# Patient Record
Sex: MALE | Race: WHITE | NOT HISPANIC OR LATINO | URBAN - METROPOLITAN AREA
[De-identification: names, ages, dates, MRNs, and addresses within clinical notes are randomized per-mention and may not be internally consistent; named-entity substitution may affect disease eponyms.]

---

## 2017-05-16 ENCOUNTER — SKIN CHECK (OUTPATIENT)
Dept: URBAN - METROPOLITAN AREA CLINIC 9 | Facility: CLINIC | Age: 47
Setting detail: DERMATOLOGY
End: 2017-05-16

## 2017-05-16 DIAGNOSIS — L82.1 OTHER SEBORRHEIC KERATOSIS: ICD-10-CM

## 2017-05-16 DIAGNOSIS — Z85.828 PERSONAL HISTORY OF OTHER MALIGNANT NEOPLASM OF SKIN: ICD-10-CM

## 2017-05-16 DIAGNOSIS — D18.01 HEMANGIOMA OF SKIN AND SUBCUTANEOUS TISSUE: ICD-10-CM

## 2017-05-16 PROCEDURE — 99214 OFFICE O/P EST MOD 30 MIN: CPT

## 2017-11-14 ENCOUNTER — SKIN CANCER-MELANOMA FOLLOW-UP (OUTPATIENT)
Dept: URBAN - METROPOLITAN AREA CLINIC 9 | Facility: CLINIC | Age: 47
Setting detail: DERMATOLOGY
End: 2017-11-14

## 2017-11-14 DIAGNOSIS — L57.0 ACTINIC KERATOSIS: ICD-10-CM

## 2017-11-14 PROCEDURE — 99214 OFFICE O/P EST MOD 30 MIN: CPT

## 2017-11-14 RX ORDER — MOMETASONE FUROATE 1 MG/G
1 APPLICATION CREAM TOPICAL BID
Qty: 45 | Refills: 2 | Status: DISCONTINUED
Start: 2017-11-14 | End: 2019-01-17

## 2018-05-23 ENCOUNTER — SKIN CANCER-MELANOMA FOLLOW-UP (OUTPATIENT)
Dept: URBAN - METROPOLITAN AREA CLINIC 9 | Facility: CLINIC | Age: 48
Setting detail: DERMATOLOGY
End: 2018-05-23

## 2018-05-23 DIAGNOSIS — L98.499 NON-PRESSURE CHRONIC ULCER OF SKIN OF OTHER SITES WITH UNSPECIFIED SEVERITY: ICD-10-CM

## 2018-05-23 PROCEDURE — 99213 OFFICE O/P EST LOW 20 MIN: CPT

## 2019-01-17 ENCOUNTER — OTHER- (OUTPATIENT)
Dept: URBAN - METROPOLITAN AREA CLINIC 9 | Facility: CLINIC | Age: 49
Setting detail: DERMATOLOGY
End: 2019-01-17

## 2019-01-17 ENCOUNTER — RX ONLY (RX ONLY)
Age: 49
End: 2019-01-17

## 2019-01-17 DIAGNOSIS — C44.329 SQUAMOUS CELL CARCINOMA OF SKIN OF OTHER PARTS OF FACE: ICD-10-CM

## 2019-01-17 PROCEDURE — 99213 OFFICE O/P EST LOW 20 MIN: CPT

## 2019-01-17 RX ORDER — MOMETASONE FUROATE 1 MG/G
1 APPLICATION CREAM TOPICAL BID
Qty: 45 | Refills: 2
Start: 2019-01-17

## 2019-07-18 ENCOUNTER — SKIN CANCER-MELANOMA FOLLOW-UP (OUTPATIENT)
Dept: URBAN - METROPOLITAN AREA CLINIC 9 | Facility: CLINIC | Age: 49
Setting detail: DERMATOLOGY
End: 2019-07-18

## 2019-07-18 DIAGNOSIS — D48.5 NEOPLASM OF UNCERTAIN BEHAVIOR OF SKIN: ICD-10-CM

## 2019-07-18 PROCEDURE — 99214 OFFICE O/P EST MOD 30 MIN: CPT

## 2020-01-20 ENCOUNTER — COMPLETE SKIN EXAM (OUTPATIENT)
Dept: URBAN - METROPOLITAN AREA CLINIC 9 | Facility: CLINIC | Age: 50
Setting detail: DERMATOLOGY
End: 2020-01-20

## 2020-01-20 DIAGNOSIS — Z85.828 PERSONAL HISTORY OF OTHER MALIGNANT NEOPLASM OF SKIN: ICD-10-CM

## 2020-01-20 DIAGNOSIS — D22.9 MELANOCYTIC NEVI, UNSPECIFIED: ICD-10-CM

## 2020-01-20 DIAGNOSIS — L72.0 EPIDERMAL CYST: ICD-10-CM

## 2020-01-20 DIAGNOSIS — L91.8 OTHER HYPERTROPHIC DISORDERS OF THE SKIN: ICD-10-CM

## 2020-01-20 PROCEDURE — 99214 OFFICE O/P EST MOD 30 MIN: CPT

## 2020-07-20 ENCOUNTER — RX ONLY (RX ONLY)
Age: 50
End: 2020-07-20

## 2020-07-20 ENCOUNTER — SKIN CANCER-MELANOMA FOLLOW-UP (OUTPATIENT)
Dept: URBAN - METROPOLITAN AREA CLINIC 9 | Facility: CLINIC | Age: 50
Setting detail: DERMATOLOGY
End: 2020-07-20

## 2020-07-20 DIAGNOSIS — L57.0 ACTINIC KERATOSIS: ICD-10-CM

## 2020-07-20 PROCEDURE — 99214 OFFICE O/P EST MOD 30 MIN: CPT

## 2020-07-20 RX ORDER — HYDROCORTISONE BUTYRATE 1 MG/G
1 APPLICATION CREAM TOPICAL BID
Qty: 15 | Refills: 2
Start: 2020-07-20

## 2022-12-07 ENCOUNTER — APPOINTMENT (OUTPATIENT)
Dept: URBAN - METROPOLITAN AREA CLINIC 214 | Age: 52
Setting detail: DERMATOLOGY
End: 2022-12-07

## 2022-12-07 DIAGNOSIS — Z85.820 PERSONAL HISTORY OF MALIGNANT MELANOMA OF SKIN: ICD-10-CM

## 2022-12-07 DIAGNOSIS — Z85.828 PERSONAL HISTORY OF OTHER MALIGNANT NEOPLASM OF SKIN: ICD-10-CM

## 2022-12-07 DIAGNOSIS — L82.1 OTHER SEBORRHEIC KERATOSIS: ICD-10-CM

## 2022-12-07 DIAGNOSIS — L57.0 ACTINIC KERATOSIS: ICD-10-CM

## 2022-12-07 DIAGNOSIS — D22 MELANOCYTIC NEVI: ICD-10-CM

## 2022-12-07 DIAGNOSIS — L81.4 OTHER MELANIN HYPERPIGMENTATION: ICD-10-CM

## 2022-12-07 PROBLEM — D22.5 MELANOCYTIC NEVI OF TRUNK: Status: ACTIVE | Noted: 2022-12-07

## 2022-12-07 PROCEDURE — OTHER MIPS QUALITY: OTHER

## 2022-12-07 PROCEDURE — 99213 OFFICE O/P EST LOW 20 MIN: CPT | Mod: 25

## 2022-12-07 PROCEDURE — OTHER COUNSELING: OTHER

## 2022-12-07 PROCEDURE — OTHER SUNSCREEN RECOMMENDATIONS: OTHER

## 2022-12-07 PROCEDURE — OTHER REASSURANCE: OTHER

## 2022-12-07 PROCEDURE — OTHER LIQUID NITROGEN: OTHER

## 2022-12-07 PROCEDURE — 17000 DESTRUCT PREMALG LESION: CPT

## 2022-12-07 ASSESSMENT — LOCATION SIMPLE DESCRIPTION DERM
LOCATION SIMPLE: RIGHT LOWER BACK
LOCATION SIMPLE: LEFT UPPER BACK
LOCATION SIMPLE: LEFT LOWER BACK
LOCATION SIMPLE: RIGHT UPPER BACK
LOCATION SIMPLE: ABDOMEN
LOCATION SIMPLE: RIGHT FOREARM
LOCATION SIMPLE: CHEST

## 2022-12-07 ASSESSMENT — LOCATION DETAILED DESCRIPTION DERM
LOCATION DETAILED: RIGHT MID-UPPER BACK
LOCATION DETAILED: PERIUMBILICAL SKIN
LOCATION DETAILED: RIGHT SUPERIOR UPPER BACK
LOCATION DETAILED: RIGHT VENTRAL PROXIMAL FOREARM
LOCATION DETAILED: LEFT SUPERIOR MEDIAL LOWER BACK
LOCATION DETAILED: RIGHT MEDIAL SUPERIOR CHEST
LOCATION DETAILED: RIGHT SUPERIOR MEDIAL MIDBACK
LOCATION DETAILED: LEFT SUPERIOR UPPER BACK
LOCATION DETAILED: STERNUM

## 2022-12-07 ASSESSMENT — LOCATION ZONE DERM
LOCATION ZONE: ARM
LOCATION ZONE: TRUNK

## 2022-12-07 NOTE — PROCEDURE: MIPS QUALITY
Quality 226: Preventive Care And Screening: Tobacco Use: Screening And Cessation Intervention: Patient screened for tobacco use and is an ex/non-smoker
Quality 110: Preventive Care And Screening: Influenza Immunization: Influenza Immunization Administered during Influenza season
Quality 137: Melanoma: Continuity Of Care - Recall System: Patient information entered into a recall system that includes: target date for the next exam specified AND a process to follow up with patients regarding missed or unscheduled appointments
Detail Level: Detailed
Quality 130: Documentation Of Current Medications In The Medical Record: Current Medications Documented
Quality 431: Preventive Care And Screening: Unhealthy Alcohol Use - Screening: Patient not identified as an unhealthy alcohol user when screened for unhealthy alcohol use using a systematic screening method
Quality 138: Melanoma: Coordination Of Care: A treatment plan was communicated to the physicians providing continuing care within one month of diagnosis outlining: diagnosis, tumor thickness and a plan for surgery or alternate care.
Quality 111:Pneumonia Vaccination Status For Older Adults: Pneumococcal vaccine (PPSV23) was not administered on or after patient’s 60th birthday and before the end of the measurement period, reason not otherwise specified

## 2022-12-07 NOTE — PROCEDURE: LIQUID NITROGEN
Duration Of Freeze Thaw-Cycle (Seconds): 0
Show Aperture Variable?: Yes
Number Of Freeze-Thaw Cycles: 1 freeze-thaw cycle
Render Note In Bullet Format When Appropriate: No
Detail Level: Detailed
Post-Care Instructions: I reviewed with the patient in detail post-care instructions. Patient is to wear sunprotection, and avoid picking at any of the treated lesions. Pt may apply Vaseline to crusted or scabbing areas.
Consent: The patient's consent was obtained including but not limited to risks of crusting, scabbing, blistering, scarring, darker or lighter pigmentary change, recurrence, incomplete removal and infection.
Application Tool (Optional): Liquid Nitrogen Sprayer

## 2023-02-21 ENCOUNTER — APPOINTMENT (OUTPATIENT)
Dept: URBAN - METROPOLITAN AREA CLINIC 214 | Age: 53
Setting detail: DERMATOLOGY
End: 2023-02-24

## 2023-02-21 DIAGNOSIS — L82.0 INFLAMED SEBORRHEIC KERATOSIS: ICD-10-CM

## 2023-02-21 DIAGNOSIS — L21.8 OTHER SEBORRHEIC DERMATITIS: ICD-10-CM

## 2023-02-21 PROCEDURE — OTHER COUNSELING: OTHER

## 2023-02-21 PROCEDURE — 99213 OFFICE O/P EST LOW 20 MIN: CPT | Mod: 25

## 2023-02-21 PROCEDURE — OTHER LIQUID NITROGEN: OTHER

## 2023-02-21 PROCEDURE — OTHER PRESCRIPTION SAMPLES PROVIDED: OTHER

## 2023-02-21 PROCEDURE — 17110 DESTRUCT B9 LESION 1-14: CPT

## 2023-02-21 ASSESSMENT — LOCATION ZONE DERM
LOCATION ZONE: FACE
LOCATION ZONE: NECK

## 2023-02-21 ASSESSMENT — LOCATION DETAILED DESCRIPTION DERM
LOCATION DETAILED: RIGHT INFERIOR LATERAL FOREHEAD
LOCATION DETAILED: RIGHT LATERAL FOREHEAD
LOCATION DETAILED: GLABELLA
LOCATION DETAILED: LEFT CLAVICULAR NECK

## 2023-02-21 ASSESSMENT — LOCATION SIMPLE DESCRIPTION DERM
LOCATION SIMPLE: LEFT ANTERIOR NECK
LOCATION SIMPLE: GLABELLA
LOCATION SIMPLE: RIGHT FOREHEAD

## 2023-02-21 NOTE — PROCEDURE: COUNSELING
Cleanser Recommendations: Vanicream Z bar wash as needed
Topical Steroid Recommendations: fluocinolone 0.1% scalp oil QD prn when flaring
Detail Level: Detailed
Topical Antifungal Recommendations: Exelderm solution QD-BID x 3-4 weeks prn when flaring
Shampoo Recommendations: OTC anti-dandruff (anti-yeast) shampoo rotate with ketoconazole shampoo
Moisturizer Recommendations: Vanicream

## 2023-02-21 NOTE — PROCEDURE: LIQUID NITROGEN
Consent: The patient's verbal consent was obtained including but not limited to risks of crusting, scabbing, blistering, scarring, darker or lighter pigmentary change, recurrence, incomplete removal and infection.
Application Tool (Optional): Cry-AC
Render Post-Care Instructions In Note?: yes
Render Note In Bullet Format When Appropriate: No
Number Of Freeze-Thaw Cycles: 3 freeze-thaw cycles
Medical Necessity Information: It is in your best interest to select a reason for this procedure from the list below. All of these items fulfill various CMS LCD requirements except the new and changing color options.
Medical Necessity Clause: This procedure was medically necessary because the lesions that were treated were:
Detail Level: Detailed
Post-Care Instructions: I reviewed with the patient in detail post-care instructions. Patient is to wear sunprotection, and avoid picking at any of the treated lesions. Pt may apply Vaseline to crusted or scabbing areas.
Duration Of Freeze Thaw-Cycle (Seconds): 5-10
Spray Paint Text: The liquid nitrogen was applied to the skin utilizing a spray paint frosting technique.

## 2023-07-10 ENCOUNTER — APPOINTMENT (OUTPATIENT)
Dept: URBAN - METROPOLITAN AREA CLINIC 214 | Age: 53
Setting detail: DERMATOLOGY
End: 2023-07-10

## 2023-07-10 DIAGNOSIS — L57.0 ACTINIC KERATOSIS: ICD-10-CM

## 2023-07-10 DIAGNOSIS — L81.4 OTHER MELANIN HYPERPIGMENTATION: ICD-10-CM

## 2023-07-10 DIAGNOSIS — D18.0 HEMANGIOMA: ICD-10-CM

## 2023-07-10 DIAGNOSIS — D22 MELANOCYTIC NEVI: ICD-10-CM

## 2023-07-10 DIAGNOSIS — L82.1 OTHER SEBORRHEIC KERATOSIS: ICD-10-CM

## 2023-07-10 DIAGNOSIS — Z85.820 PERSONAL HISTORY OF MALIGNANT MELANOMA OF SKIN: ICD-10-CM

## 2023-07-10 PROBLEM — D22.5 MELANOCYTIC NEVI OF TRUNK: Status: ACTIVE | Noted: 2023-07-10

## 2023-07-10 PROBLEM — D18.01 HEMANGIOMA OF SKIN AND SUBCUTANEOUS TISSUE: Status: ACTIVE | Noted: 2023-07-10

## 2023-07-10 PROCEDURE — OTHER COUNSELING: OTHER

## 2023-07-10 PROCEDURE — 17000 DESTRUCT PREMALG LESION: CPT

## 2023-07-10 PROCEDURE — OTHER LIQUID NITROGEN: OTHER

## 2023-07-10 PROCEDURE — OTHER MIPS QUALITY: OTHER

## 2023-07-10 PROCEDURE — 99213 OFFICE O/P EST LOW 20 MIN: CPT | Mod: 25

## 2023-07-10 ASSESSMENT — LOCATION DETAILED DESCRIPTION DERM
LOCATION DETAILED: RIGHT MEDIAL UPPER BACK
LOCATION DETAILED: RIGHT SUPERIOR UPPER BACK
LOCATION DETAILED: EPIGASTRIC SKIN
LOCATION DETAILED: LEFT LATERAL INFERIOR CHEST
LOCATION DETAILED: SUPERIOR THORACIC SPINE

## 2023-07-10 ASSESSMENT — LOCATION SIMPLE DESCRIPTION DERM
LOCATION SIMPLE: RIGHT UPPER BACK
LOCATION SIMPLE: CHEST
LOCATION SIMPLE: ABDOMEN
LOCATION SIMPLE: UPPER BACK

## 2023-07-10 ASSESSMENT — LOCATION ZONE DERM: LOCATION ZONE: TRUNK

## 2023-07-10 NOTE — PROCEDURE: COUNSELING
Detail Level: Generalized
Sunscreen Recommendations: Recommend SPF 50 or above and mineral based sunscreen to re-apply every 1.5 to 2 hours.
Patient Specific Counseling (Will Not Stick From Patient To Patient): SPF 50 mineral based sunscreen every 1.5-2 hours. Recommend Sun Protective clothing.
Quality 137: Melanoma: Continuity Of Care - Recall System: Patient information entered into a recall system that includes: target date for the next exam specified AND a process to follow up with patients regarding missed or unscheduled appointments
Detail Level: Detailed
When Should The Patient Follow-Up For Their Next Full-Body Skin Exam?: 6 Months

## 2023-07-10 NOTE — PROCEDURE: LIQUID NITROGEN
Show Aperture Variable?: Yes
Consent: The patient's consent was obtained including but not limited to risks of crusting, scabbing, blistering, scarring, darker or lighter pigmentary change, recurrence, incomplete removal and infection.
Render Post-Care Instructions In Note?: no
Detail Level: Detailed
Post-Care Instructions: I reviewed with the patient in detail post-care instructions. Patient is to wear sunprotection, and avoid picking at any of the treated lesions. Pt may apply Vaseline to crusted or scabbing areas.
Duration Of Freeze Thaw-Cycle (Seconds): 0
Application Tool (Optional): Cry-AC

## 2024-04-10 ENCOUNTER — APPOINTMENT (OUTPATIENT)
Dept: URBAN - METROPOLITAN AREA CLINIC 214 | Age: 54
Setting detail: DERMATOLOGY
End: 2024-04-13

## 2024-04-10 DIAGNOSIS — L44.8 OTHER SPECIFIED PAPULOSQUAMOUS DISORDERS: ICD-10-CM

## 2024-04-10 PROCEDURE — OTHER MIPS QUALITY: OTHER

## 2024-04-10 PROCEDURE — 99212 OFFICE O/P EST SF 10 MIN: CPT

## 2024-04-10 PROCEDURE — OTHER COUNSELING: OTHER

## 2024-04-10 ASSESSMENT — LOCATION SIMPLE DESCRIPTION DERM: LOCATION SIMPLE: CHEST

## 2024-04-10 ASSESSMENT — LOCATION DETAILED DESCRIPTION DERM: LOCATION DETAILED: STERNUM

## 2024-04-10 ASSESSMENT — LOCATION ZONE DERM: LOCATION ZONE: TRUNK

## 2024-04-10 NOTE — HPI: EVALUATION OF SKIN LESION(S)
How Severe Are Your Spot(S)?: mild
Hpi Title: Evaluation of a Skin Lesion
Additional History: Pt states he has a place come up on his chest about two weeks ago and it was raised, red and scaly. He has been using some ketoconazole cream to this and it has helped

## 2024-07-22 ENCOUNTER — APPOINTMENT (OUTPATIENT)
Dept: URBAN - METROPOLITAN AREA CLINIC 214 | Age: 54
Setting detail: DERMATOLOGY
End: 2024-07-22

## 2024-07-22 DIAGNOSIS — L81.4 OTHER MELANIN HYPERPIGMENTATION: ICD-10-CM

## 2024-07-22 DIAGNOSIS — D18.0 HEMANGIOMA: ICD-10-CM

## 2024-07-22 DIAGNOSIS — D49.2 NEOPLASM OF UNSPECIFIED BEHAVIOR OF BONE, SOFT TISSUE, AND SKIN: ICD-10-CM

## 2024-07-22 DIAGNOSIS — Z85.820 PERSONAL HISTORY OF MALIGNANT MELANOMA OF SKIN: ICD-10-CM

## 2024-07-22 DIAGNOSIS — D22 MELANOCYTIC NEVI: ICD-10-CM

## 2024-07-22 DIAGNOSIS — L82.1 OTHER SEBORRHEIC KERATOSIS: ICD-10-CM

## 2024-07-22 PROBLEM — D18.01 HEMANGIOMA OF SKIN AND SUBCUTANEOUS TISSUE: Status: ACTIVE | Noted: 2024-07-22

## 2024-07-22 PROBLEM — D22.5 MELANOCYTIC NEVI OF TRUNK: Status: ACTIVE | Noted: 2024-07-22

## 2024-07-22 PROCEDURE — OTHER COUNSELING: OTHER

## 2024-07-22 PROCEDURE — 11310 SHAVE SKIN LESION 0.5 CM/<: CPT

## 2024-07-22 PROCEDURE — 99213 OFFICE O/P EST LOW 20 MIN: CPT | Mod: 25

## 2024-07-22 PROCEDURE — OTHER SHAVE REMOVAL: OTHER

## 2024-07-22 PROCEDURE — OTHER MIPS QUALITY: OTHER

## 2024-07-22 ASSESSMENT — LOCATION DETAILED DESCRIPTION DERM
LOCATION DETAILED: EPIGASTRIC SKIN
LOCATION DETAILED: RIGHT MEDIAL UPPER BACK
LOCATION DETAILED: RIGHT SUPERIOR UPPER BACK
LOCATION DETAILED: SUPERIOR THORACIC SPINE
LOCATION DETAILED: RIGHT INFERIOR LATERAL MALAR CHEEK

## 2024-07-22 ASSESSMENT — LOCATION SIMPLE DESCRIPTION DERM
LOCATION SIMPLE: ABDOMEN
LOCATION SIMPLE: RIGHT UPPER BACK
LOCATION SIMPLE: UPPER BACK
LOCATION SIMPLE: RIGHT CHEEK

## 2024-07-22 ASSESSMENT — LOCATION ZONE DERM
LOCATION ZONE: TRUNK
LOCATION ZONE: FACE

## 2024-07-22 NOTE — PROCEDURE: SHAVE REMOVAL
Medical Necessity Information: It is in your best interest to select a reason for this procedure from the list below. All of these items fulfill various CMS LCD requirements except the new and changing color options.
Medical Necessity Clause: This procedure was medically necessary because the lesion that was treated was
Lab: [id number]
Lab Facility: [id number]
Detail Level: Detailed
Was A Bandage Applied: Yes
Size Of Lesion In Cm (Required): 0.3
Depth Of Shave: dermis
Biopsy Method: Dermablade
Anesthesia Type: 1% lidocaine with epinephrine
Anesthesia Volume In Cc: 0.5
Hemostasis: Electrocautery
Wound Care: Petrolatum
Render Path Notes In Note?: No
Consent was obtained from the patient. The risks and benefits to therapy were discussed in detail. Specifically, the risks of infection, scarring, bleeding, prolonged wound healing, incomplete removal, allergy to anesthesia, nerve injury and recurrence were addressed. Prior to the procedure, the treatment site was clearly identified and confirmed by the patient. All components of Universal Protocol/PAUSE Rule completed.
Post-Care Instructions: I reviewed with the patient in detail post-care instructions. Patient is to keep the biopsy site dry overnight, and then apply aquaphor or vaseline daily with a wound dressing.
Notification Instructions: Patient will be notified of pathology results. However, patient instructed to call the office if not contacted within 2 weeks.
Billing Type: Third-Party Bill

## 2024-07-22 NOTE — PROCEDURE: COUNSELING
Quality 137: Melanoma: Continuity Of Care - Recall System: Patient information entered into a recall system that includes: target date for the next exam specified AND a process to follow up with patients regarding missed or unscheduled appointments
Detail Level: Detailed
When Should The Patient Follow-Up For Their Next Full-Body Skin Exam?: 6 Months
Sunscreen Recommendations: Recommend SPF 50 or above and mineral based sunscreen to re-apply every 1.5 to 2 hours.
Detail Level: Generalized
Patient Specific Counseling (Will Not Stick From Patient To Patient): SPF 50 mineral based sunscreen every 1.5-2 hours. Recommend Sun Protective clothing.

## 2025-03-26 ENCOUNTER — APPOINTMENT (OUTPATIENT)
Dept: URBAN - METROPOLITAN AREA CLINIC 214 | Age: 55
Setting detail: DERMATOLOGY
End: 2025-03-27

## 2025-03-26 DIAGNOSIS — L23.9 ALLERGIC CONTACT DERMATITIS, UNSPECIFIED CAUSE: ICD-10-CM

## 2025-03-26 DIAGNOSIS — L30.9 DERMATITIS, UNSPECIFIED: ICD-10-CM

## 2025-03-26 PROCEDURE — OTHER COUNSELING: OTHER

## 2025-03-26 PROCEDURE — OTHER PRESCRIPTION MEDICATION MANAGEMENT: OTHER

## 2025-03-26 PROCEDURE — 99213 OFFICE O/P EST LOW 20 MIN: CPT

## 2025-03-26 PROCEDURE — OTHER MIPS QUALITY: OTHER

## 2025-03-26 PROCEDURE — OTHER PRESCRIPTION: OTHER

## 2025-03-26 RX ORDER — FLUTICASONE PROPIONATE 0.05 MG/G
OINTMENT TOPICAL
Qty: 15 | Refills: 0 | Status: ERX | COMMUNITY
Start: 2025-03-26

## 2025-03-26 ASSESSMENT — ITCH NUMERIC RATING SCALE: HOW SEVERE IS YOUR ITCHING?: 7

## 2025-03-26 ASSESSMENT — LOCATION SIMPLE DESCRIPTION DERM
LOCATION SIMPLE: LEFT UPPER ARM
LOCATION SIMPLE: PERIANAL SKIN

## 2025-03-26 ASSESSMENT — LOCATION DETAILED DESCRIPTION DERM
LOCATION DETAILED: PERIANAL SKIN
LOCATION DETAILED: LEFT ANTERIOR PROXIMAL UPPER ARM

## 2025-03-26 ASSESSMENT — BSA RASH: BSA RASH: 0

## 2025-03-26 ASSESSMENT — LOCATION ZONE DERM
LOCATION ZONE: ARM
LOCATION ZONE: ANUS

## 2025-03-26 ASSESSMENT — SEVERITY ASSESSMENT: SEVERITY: MILD

## 2025-03-26 NOTE — HPI: RASH
What Type Of Note Output Would You Prefer (Optional)?: Standard Output
How Severe Is Your Rash?: mild
Is This A New Presentation, Or A Follow-Up?: Rash
Additional History: Last week, pt visited PCP and was given clotrimasol Betamethasone. It has improved within the last few weeks.

## 2025-03-26 NOTE — PROCEDURE: PRESCRIPTION MEDICATION MANAGEMENT
Render In Strict Bullet Format?: No
Detail Level: Zone
Initiate Treatment: fluticasone propionate 0.005 % topical ointment: Apply to affected area BID for flares\\n\\nZinc Oxide
Initiate Treatment: fluticasone propionate 0.005 % topical ointment: Apply to affected area BID for flares

## 2025-05-06 ENCOUNTER — APPOINTMENT (OUTPATIENT)
Dept: URBAN - METROPOLITAN AREA CLINIC 214 | Age: 55
Setting detail: DERMATOLOGY
End: 2025-05-07

## 2025-05-06 DIAGNOSIS — L30.9 DERMATITIS, UNSPECIFIED: ICD-10-CM

## 2025-05-06 PROCEDURE — 99213 OFFICE O/P EST LOW 20 MIN: CPT

## 2025-05-06 PROCEDURE — OTHER PRESCRIPTION MEDICATION MANAGEMENT: OTHER

## 2025-05-06 PROCEDURE — OTHER COUNSELING: OTHER

## 2025-05-06 PROCEDURE — OTHER MIPS QUALITY: OTHER

## 2025-05-06 ASSESSMENT — LOCATION DETAILED DESCRIPTION DERM: LOCATION DETAILED: INFERIOR THORACIC SPINE

## 2025-05-06 ASSESSMENT — LOCATION ZONE DERM: LOCATION ZONE: TRUNK

## 2025-05-06 ASSESSMENT — LOCATION SIMPLE DESCRIPTION DERM: LOCATION SIMPLE: UPPER BACK

## 2025-05-06 ASSESSMENT — ITCH NUMERIC RATING SCALE: HOW SEVERE IS YOUR ITCHING?: 2

## 2025-07-23 ENCOUNTER — APPOINTMENT (OUTPATIENT)
Dept: URBAN - METROPOLITAN AREA CLINIC 214 | Age: 55
Setting detail: DERMATOLOGY
End: 2025-07-23

## 2025-07-23 DIAGNOSIS — Z85.828 PERSONAL HISTORY OF OTHER MALIGNANT NEOPLASM OF SKIN: ICD-10-CM

## 2025-07-23 DIAGNOSIS — D49.2 NEOPLASM OF UNSPECIFIED BEHAVIOR OF BONE, SOFT TISSUE, AND SKIN: ICD-10-CM

## 2025-07-23 DIAGNOSIS — L81.4 OTHER MELANIN HYPERPIGMENTATION: ICD-10-CM

## 2025-07-23 DIAGNOSIS — Z85.820 PERSONAL HISTORY OF MALIGNANT MELANOMA OF SKIN: ICD-10-CM

## 2025-07-23 DIAGNOSIS — D22 MELANOCYTIC NEVI: ICD-10-CM

## 2025-07-23 DIAGNOSIS — L82.1 OTHER SEBORRHEIC KERATOSIS: ICD-10-CM

## 2025-07-23 PROBLEM — D22.5 MELANOCYTIC NEVI OF TRUNK: Status: ACTIVE | Noted: 2025-07-23

## 2025-07-23 PROCEDURE — OTHER REASSURANCE: OTHER

## 2025-07-23 PROCEDURE — OTHER COUNSELING: OTHER

## 2025-07-23 PROCEDURE — 11102 TANGNTL BX SKIN SINGLE LES: CPT

## 2025-07-23 PROCEDURE — 99213 OFFICE O/P EST LOW 20 MIN: CPT | Mod: 25

## 2025-07-23 PROCEDURE — OTHER SUNSCREEN RECOMMENDATIONS: OTHER

## 2025-07-23 PROCEDURE — OTHER MIPS QUALITY: OTHER

## 2025-07-23 PROCEDURE — OTHER BIOPSY BY SHAVE METHOD: OTHER

## 2025-07-23 ASSESSMENT — LOCATION SIMPLE DESCRIPTION DERM
LOCATION SIMPLE: ABDOMEN
LOCATION SIMPLE: LEFT UPPER BACK
LOCATION SIMPLE: LEFT LOWER BACK
LOCATION SIMPLE: CHEST
LOCATION SIMPLE: RIGHT LOWER BACK
LOCATION SIMPLE: RIGHT UPPER BACK

## 2025-07-23 ASSESSMENT — LOCATION DETAILED DESCRIPTION DERM
LOCATION DETAILED: LEFT SUPERIOR MEDIAL LOWER BACK
LOCATION DETAILED: STERNUM
LOCATION DETAILED: RIGHT SUPERIOR MEDIAL MIDBACK
LOCATION DETAILED: PERIUMBILICAL SKIN
LOCATION DETAILED: LEFT SUPERIOR UPPER BACK
LOCATION DETAILED: RIGHT SUPERIOR UPPER BACK

## 2025-07-23 ASSESSMENT — LOCATION ZONE DERM: LOCATION ZONE: TRUNK
